# Patient Record
(demographics unavailable — no encounter records)

---

## 2024-12-02 NOTE — HISTORY OF PRESENT ILLNESS
[N] : Patient does not use contraception [Y] : Patient is sexually active [Currently Active] : currently active [Mammogramdate] : 1/29/24 [TextBox_19] : br2 [BreastSonogramDate] : 1/29/24 [TextBox_25] : br2 [PapSmeardate] : 11/6/23 [TextBox_31] : neg [BoneDensityDate] : 9/1/21 [TextBox_37] : osteopenia [ColonoscopyDate] : 2021 [TextBox_43] : as per pt  [HPVDate] : 11/6/23 [TextBox_78] : neg [LMPDate] : 2011 [PGHxTotal] : 2 [[facility] : [facility] [[facility] : 2 [FreeTextEntry1] : 2011

## 2024-12-02 NOTE — REVIEW OF SYSTEMS
[Patient Intake Form Reviewed] : Patient intake form was reviewed [Frequency] : frequency [Dysuria] : dysuria [Negative] : Heme/Lymph

## 2024-12-02 NOTE — PLAN
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 We will order a UC&S, Urine cytology and a KUB Ct scan. We will draw her metabolic panel here so she will be prepped for the contrast.  She will go for her breast imaging in 1/2025. We will repeat her DXA/VFA at that time. During this visit 30 minutes were spent face-to-face with greater than 50% of this time dedicated to counseling.

## 2024-12-02 NOTE — HISTORY OF PRESENT ILLNESS
[N] : Patient does not use contraception [Y] : Patient is sexually active [Currently Active] : currently active [Mammogramdate] : 1/29/24 [TextBox_19] : br2 [BreastSonogramDate] : 1/29/24 [TextBox_25] : br2 [PapSmeardate] : 11/6/23 [TextBox_31] : neg [BoneDensityDate] : 9/1/21 [TextBox_37] : osteopenia [ColonoscopyDate] : 2021 [TextBox_43] : as per pt  [HPVDate] : 11/6/23 [TextBox_78] : neg [LMPDate] : 2011 [PGHxTotal] : 2 [[facility]] : 2 [[facility] 2 [FreeTextEntry1] : 2011

## 2024-12-02 NOTE — PHYSICAL EXAM
[Chaperone Present] : A chaperone was present in the examining room during all aspects of the physical examination [[id number]] : A chaperone was present during the pelvic exam. [FreeTextEntry2] : Camille GARCIA [Appropriately responsive] : appropriately responsive [Alert] : alert [No Acute Distress] : no acute distress [No Lymphadenopathy] : no lymphadenopathy [Regular Rate Rhythm] : regular rate rhythm [No Murmurs] : no murmurs [Clear to Auscultation B/L] : clear to auscultation bilaterally [Soft] : soft [Non-tender] : non-tender [Non-distended] : non-distended [No HSM] : No HSM [No Lesions] : no lesions [No Mass] : no mass [Oriented x3] : oriented x3 [Examination Of The Breasts] : a normal appearance [No Masses] : no breast masses were palpable [Labia Majora] : normal [Labia Minora] : normal [Normal] : normal [Uterine Adnexae] : normal [No Tenderness] : no tenderness [Nl Sphincter Tone] : normal sphincter tone [FreeTextEntry9] : Heme negative

## 2024-12-02 NOTE — PHYSICAL EXAM
[Chaperone Present] : A chaperone was present in the examining room during all aspects of the physical examination [[id number]] : A chaperone was present during the pelvic exam. [FreeTextEntry2] : Camille GARCIA [Appropriately responsive] : appropriately responsive [Alert] : alert [No Acute Distress] : no acute distress [No Lymphadenopathy] : no lymphadenopathy [Regular Rate Rhythm] : regular rate rhythm [No Murmurs] : no murmurs [Clear to Auscultation B/L] : clear to auscultation bilaterally [Soft] : soft [Non-tender] : non-tender [Non-distended] : non-distended [No HSM] : No HSM [No Lesions] : no lesions [No Mass] : no mass [Oriented x3] : oriented x3 [Examination Of The Breasts] : a normal appearance [No Masses] : no breast masses were palpable [Labia Majora] : normal [Labia Minora] : normal [Normal] : normal [Uterine Adnexae] : normal [No Tenderness] : no tenderness [Nl Sphincter Tone] : normal sphincter tone [FreeTextEntry9] : Heme negative